# Patient Record
Sex: MALE | Race: WHITE | NOT HISPANIC OR LATINO | Employment: FULL TIME | URBAN - METROPOLITAN AREA
[De-identification: names, ages, dates, MRNs, and addresses within clinical notes are randomized per-mention and may not be internally consistent; named-entity substitution may affect disease eponyms.]

---

## 2020-09-09 PROBLEM — S62.612B: Status: ACTIVE | Noted: 2020-09-09
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
## 2020-09-22 RX ORDER — OXYCODONE HYDROCHLORIDE 5 MG/1
5 TABLET ORAL EVERY 6 HOURS PRN
Qty: 12 TABLET | Refills: 0 | Status: SHIPPED | OUTPATIENT
Start: 2020-09-22 | End: 2020-09-24 | Stop reason: SDUPTHER

## 2020-09-22 NOTE — TELEPHONE ENCOUNTER
----- Message from Em Williamson MA sent at 9/22/2020  8:14 AM CDT -----  Contact: pt  Out of antibioitic   Needs something for pain, still in pain   Pharmacy [facility]   Call back

## 2020-09-22 NOTE — TELEPHONE ENCOUNTER
Informed pt that Dr. Bautista is in surgery, and that this would be addressed as soon as he returns to clinic. Pt verbalized understanding.

## 2020-09-24 ENCOUNTER — OFFICE VISIT (OUTPATIENT)
Dept: ORTHOPEDICS | Facility: CLINIC | Age: 25
End: 2020-09-24
Payer: OTHER MISCELLANEOUS

## 2020-09-24 ENCOUNTER — HOSPITAL ENCOUNTER (OUTPATIENT)
Dept: RADIOLOGY | Facility: HOSPITAL | Age: 25
Discharge: HOME | End: 2020-09-24
Attending: ORTHOPAEDIC SURGERY
Payer: OTHER MISCELLANEOUS

## 2020-09-24 VITALS
WEIGHT: 189 LBS | SYSTOLIC BLOOD PRESSURE: 134 MMHG | DIASTOLIC BLOOD PRESSURE: 84 MMHG | HEIGHT: 71 IN | HEART RATE: 81 BPM | BODY MASS INDEX: 26.46 KG/M2
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
## 2020-09-24 DIAGNOSIS — S62.612D OPEN DISPLACED FRACTURE OF PROXIMAL PHALANX OF RIGHT MIDDLE FINGER WITH ROUTINE HEALING, SUBSEQUENT ENCOUNTER: Primary | ICD-10-CM

## 2020-09-24 DIAGNOSIS — S62.612D OPEN DISPLACED FRACTURE OF PROXIMAL PHALANX OF RIGHT MIDDLE FINGER WITH ROUTINE HEALING, SUBSEQUENT ENCOUNTER: ICD-10-CM

## 2020-09-24 PROCEDURE — 73130 X-RAY EXAM OF HAND: CPT | Mod: 26,RT,, | Performed by: RADIOLOGY

## 2020-09-24 PROCEDURE — 99999 PR PBB SHADOW E&M-EST. PATIENT-LVL III: CPT | Mod: PBBFAC,,, | Performed by: ORTHOPAEDIC SURGERY

## 2020-09-24 PROCEDURE — 73130 X-RAY EXAM OF HAND: CPT | Mod: TC,PO,RT

## 2020-09-24 PROCEDURE — 99024 PR POST-OP FOLLOW-UP VISIT: ICD-10-PCS | Mod: S$GLB,,, | Performed by: ORTHOPAEDIC SURGERY

## 2020-09-24 PROCEDURE — 99999 PR PBB SHADOW E&M-EST. PATIENT-LVL III: ICD-10-PCS | Mod: PBBFAC,,, | Performed by: ORTHOPAEDIC SURGERY

## 2020-09-24 PROCEDURE — 73130 XR HAND COMPLETE 3 VIEW RIGHT: ICD-10-PCS | Mod: 26,RT,, | Performed by: RADIOLOGY

## 2020-09-24 PROCEDURE — 99024 POSTOP FOLLOW-UP VISIT: CPT | Mod: S$GLB,,, | Performed by: ORTHOPAEDIC SURGERY

## 2020-09-24 RX ORDER — SULFAMETHOXAZOLE AND TRIMETHOPRIM 800; 160 MG/1; MG/1
1 TABLET ORAL 2 TIMES DAILY
Qty: 14 TABLET | Refills: 0 | Status: SHIPPED | OUTPATIENT
Start: 2020-09-24 | End: 2020-10-01

## 2020-09-24 NOTE — PROGRESS NOTES
Mr Lee returns to clinic today.  Has a history of right middle finger open proximal phalanx fracture with extensor tendon disruption.  He also had severe laceration to the ring finger with complex wound closure.  He is here today for his 1st postoperative visit.  He states that he has finished all of his antibiotics.    Physical exam:  Examination the right hand reveals that there is a large amount of exudate coming from the ring finger.  There is no overt wound breakdown and there is no proximal edema or erythema.  He does have wounds overlying the middle finger which are healing with sutures in place.  There is mild edema remaining to the middle finger.  He does report decreased sensation at the tip of the middle finger but intact of the ring finger.  Has capillary refill less than 2 sec at the tips of both of the fingers.    Radiology:  X-rays of the right hand were taken in clinic today.  He is noted have a comminuted fracture of the proximal phalanx of the middle finger.  This is stabilized with pin and screw fixation.  Fractures migraine relatively well aligned.    Assessment:  Right middle finger open proximal phalanx fracture with extensor tendon disruption, right ring finger complex laceration post closure    Plan:    1.  All of his wounds were cleaned today and a splint was placed to the middle finger    2.  I will resume antibiotics but will place him on Bactrim DS for 7 additional days    3.  He will follow up with me in 1 week for repeat evaluation.    4.  He will begin daily washing and dressing changes to the ring finger

## 2020-09-29 RX ORDER — OXYCODONE HYDROCHLORIDE 5 MG/1
5 TABLET ORAL EVERY 8 HOURS PRN
Qty: 8 TABLET | Refills: 0 | Status: ON HOLD | OUTPATIENT
Start: 2020-09-29 | End: 2021-01-14 | Stop reason: HOSPADM

## 2020-09-30 ENCOUNTER — OFFICE VISIT (OUTPATIENT)
Dept: ORTHOPEDICS | Facility: CLINIC | Age: 25
End: 2020-09-30
Payer: OTHER MISCELLANEOUS

## 2020-09-30 VITALS — WEIGHT: 188.94 LBS | HEIGHT: 71 IN | BODY MASS INDEX: 26.45 KG/M2
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
## 2020-09-30 DIAGNOSIS — S62.612D OPEN DISPLACED FRACTURE OF PROXIMAL PHALANX OF RIGHT MIDDLE FINGER WITH ROUTINE HEALING, SUBSEQUENT ENCOUNTER: ICD-10-CM

## 2020-09-30 PROCEDURE — 99999 PR PBB SHADOW E&M-EST. PATIENT-LVL III: ICD-10-PCS | Mod: PBBFAC,,, | Performed by: ORTHOPAEDIC SURGERY

## 2020-09-30 PROCEDURE — 99024 PR POST-OP FOLLOW-UP VISIT: ICD-10-PCS | Mod: S$GLB,,, | Performed by: ORTHOPAEDIC SURGERY

## 2020-09-30 PROCEDURE — 99024 POSTOP FOLLOW-UP VISIT: CPT | Mod: S$GLB,,, | Performed by: ORTHOPAEDIC SURGERY

## 2020-09-30 PROCEDURE — 99999 PR PBB SHADOW E&M-EST. PATIENT-LVL III: CPT | Mod: PBBFAC,,, | Performed by: ORTHOPAEDIC SURGERY

## 2020-10-06 NOTE — PROGRESS NOTES
Mr Lee returns to clinic today.  Has a history of right middle finger open proximal phalanx fracture with extensor tendon disruption is also complex laceration of the ring finger.  He is overall doing well.  He continues to improve.    Physical exam:  Examination the right hand reveals that the wounds continue to improve.  There is several remaining sutures noted.  There is mild edema remaining to both of the fingers.  There is capillary refill less than 2 sec overall finger tips.        Assessment:  Right middle finger proximal phalanx fracture with extensor disruption, right ring finger complex wound closure    Plan:    1.  There were a few sutures remaining which were removed today.    2.  A splint was placed onto the middle finger.  Soft dressing was placed onto the ring finger.  Patient tolerated these well    3.  The patient will follow up with me in 10-14 days for repeat evaluation with an x-ray of the right hand at which point I will consider beginning daily dressing changes and washing

## 2020-10-20 DIAGNOSIS — S62.612D OPEN DISPLACED FRACTURE OF PROXIMAL PHALANX OF RIGHT MIDDLE FINGER WITH ROUTINE HEALING, SUBSEQUENT ENCOUNTER: Primary | ICD-10-CM

## 2020-10-21 ENCOUNTER — OFFICE VISIT (OUTPATIENT)
Dept: ORTHOPEDICS | Facility: CLINIC | Age: 25
End: 2020-10-21
Payer: OTHER MISCELLANEOUS

## 2020-10-21 ENCOUNTER — HOSPITAL ENCOUNTER (OUTPATIENT)
Dept: RADIOLOGY | Facility: HOSPITAL | Age: 25
Discharge: HOME | End: 2020-10-21
Attending: ORTHOPAEDIC SURGERY
Payer: OTHER MISCELLANEOUS

## 2020-10-21 VITALS
SYSTOLIC BLOOD PRESSURE: 154 MMHG | DIASTOLIC BLOOD PRESSURE: 87 MMHG | HEIGHT: 71 IN | HEART RATE: 89 BPM | WEIGHT: 188.94 LBS | BODY MASS INDEX: 26.45 KG/M2

## 2020-10-21 DIAGNOSIS — S56.429D EXTENSOR TENDON LACERATION, FINGER, OPEN WOUND, SUBSEQUENT ENCOUNTER: ICD-10-CM

## 2020-10-21 DIAGNOSIS — S62.612D OPEN DISPLACED FRACTURE OF PROXIMAL PHALANX OF RIGHT MIDDLE FINGER WITH ROUTINE HEALING, SUBSEQUENT ENCOUNTER: ICD-10-CM

## 2020-10-21 DIAGNOSIS — S62.612D OPEN DISPLACED FRACTURE OF PROXIMAL PHALANX OF RIGHT MIDDLE FINGER WITH ROUTINE HEALING, SUBSEQUENT ENCOUNTER: Primary | ICD-10-CM
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
## 2020-10-21 DIAGNOSIS — S61.209D EXTENSOR TENDON LACERATION, FINGER, OPEN WOUND, SUBSEQUENT ENCOUNTER: ICD-10-CM

## 2020-10-21 PROCEDURE — 99999 PR PBB SHADOW E&M-EST. PATIENT-LVL III: ICD-10-PCS | Mod: PBBFAC,,, | Performed by: ORTHOPAEDIC SURGERY

## 2020-10-21 PROCEDURE — 99024 PR POST-OP FOLLOW-UP VISIT: ICD-10-PCS | Mod: S$GLB,,, | Performed by: ORTHOPAEDIC SURGERY

## 2020-10-21 PROCEDURE — 73130 XR HAND COMPLETE 3 VIEW RIGHT: ICD-10-PCS | Mod: 26,RT,, | Performed by: RADIOLOGY

## 2020-10-21 PROCEDURE — 73130 X-RAY EXAM OF HAND: CPT | Mod: TC,PO,RT

## 2020-10-21 PROCEDURE — 73130 X-RAY EXAM OF HAND: CPT | Mod: 26,RT,, | Performed by: RADIOLOGY

## 2020-10-21 PROCEDURE — 99999 PR PBB SHADOW E&M-EST. PATIENT-LVL III: CPT | Mod: PBBFAC,,, | Performed by: ORTHOPAEDIC SURGERY

## 2020-10-21 PROCEDURE — 99024 POSTOP FOLLOW-UP VISIT: CPT | Mod: S$GLB,,, | Performed by: ORTHOPAEDIC SURGERY

## 2020-10-21 NOTE — PROGRESS NOTES
Mr Lee returns to clinic today.  Has a history of right hand saw injury.  Had massive wound to the middle finger.  This included wrist disruption of the extensor tendon as well as comminuted fracture of the middle phalanx.  The ring finger also had a massive soft tissue injury noted.  He is overall improving.  He has discontinued his splint and has begun working on his own on his motion.    Physical exam:  Examination the right hand reveals that all wounds are now healed.  There is an obvious flexion deformity of the PIP joint of the middle finger.  Palpation about the hand finger does produce moderate tenderness over the middle and ring fingers.  He is able to flex and extend the ring finger without significant limitation.  The middle finger has approximately 20° of flexion at the PIP joint.  He is able to extend with a limitation of 45° from full extension.  This is both active and passive.  He does report mild decreased sensation over the tip of the finger and capillary refill less than 2 sec    Radiology:  X-rays of the right middle finger and hand were taken in clinic today.  There is noted to be comminuted fracture of the middle finger proximal phalanx.  This stabilized with plate screw and K-wire fixation.    Assessment:  Right hand table saw injury with disruption of the extensor mechanism and fracture of the proximal phalanx of the middle finger as well as massive soft tissue injury to the ring finger    Plan:    1.  I will set him up with occupational therapy to begin gentle range of motion, edema control, and scar massage    2.  I will allow him to return to work without the use of the right arm or hand    3.  He will follow up with me in 3 weeks with repeat x-rays of the right middle finger which point I will discuss any further treatment needs

## 2020-10-21 NOTE — LETTER
October 21, 2020      [facility]  [address] [place] [address]  Phone: [phone]       Patient: Marcus Lee   YOB: 1995  Date of Visit: 10/21/2020    To Whom It May Concern:    Lizy Lee  was at [facility] on 10/21/2020. He may return to work/school on 10/21/2020 with restrictions of no use of right hand, and allow for intermittent absences to attend therapy. If you have any questions or concerns, or if I can be of further assistance, please do not hesitate to contact me.    Sincerely,    Sadie Pimentel LPN

## 2020-10-30 ENCOUNTER — TELEPHONE (OUTPATIENT)
Dept: ORTHOPEDICS | Facility: CLINIC | Age: 25
End: 2020-10-30

## 2020-10-30 NOTE — TELEPHONE ENCOUNTER
Spoke with patient. Explained that Dr. Bautista and his staff would not be back into the office until next week, but I would forward the message to them. Patient states that he just wants to make sure his worker's comp paperwork was sent to his employer. Understanding verbalized.

## 2020-11-11 DIAGNOSIS — S62.612D OPEN DISPLACED FRACTURE OF PROXIMAL PHALANX OF RIGHT MIDDLE FINGER WITH ROUTINE HEALING, SUBSEQUENT ENCOUNTER: Primary | ICD-10-CM

## 2020-12-09 ENCOUNTER — OFFICE VISIT (OUTPATIENT)
Dept: ORTHOPEDICS | Facility: CLINIC | Age: 25
End: 2020-12-09
Payer: OTHER MISCELLANEOUS

## 2020-12-09 ENCOUNTER — HOSPITAL ENCOUNTER (OUTPATIENT)
Dept: RADIOLOGY | Facility: HOSPITAL | Age: 25
Discharge: HOME | End: 2020-12-09
Attending: ORTHOPAEDIC SURGERY
Payer: OTHER MISCELLANEOUS

## 2020-12-09 VITALS
BODY MASS INDEX: 26.45 KG/M2 | HEART RATE: 125 BPM | SYSTOLIC BLOOD PRESSURE: 158 MMHG | WEIGHT: 188.94 LBS | DIASTOLIC BLOOD PRESSURE: 97 MMHG | HEIGHT: 71 IN

## 2020-12-09 DIAGNOSIS — S62.612D OPEN DISPLACED FRACTURE OF PROXIMAL PHALANX OF RIGHT MIDDLE FINGER WITH ROUTINE HEALING, SUBSEQUENT ENCOUNTER: ICD-10-CM
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
## 2020-12-09 DIAGNOSIS — S61.209S EXTENSOR TENDON LACERATION OF FINGER WITH OPEN WOUND, SEQUELA: ICD-10-CM

## 2020-12-09 DIAGNOSIS — S62.612D OPEN DISPLACED FRACTURE OF PROXIMAL PHALANX OF RIGHT MIDDLE FINGER WITH ROUTINE HEALING, SUBSEQUENT ENCOUNTER: Primary | ICD-10-CM

## 2020-12-09 DIAGNOSIS — S56.429S EXTENSOR TENDON LACERATION OF FINGER WITH OPEN WOUND, SEQUELA: ICD-10-CM

## 2020-12-09 PROBLEM — S61.209A EXTENSOR TENDON LACERATION OF FINGER WITH OPEN WOUND: Status: ACTIVE | Noted: 2020-12-09

## 2020-12-09 PROBLEM — S56.429A EXTENSOR TENDON LACERATION OF FINGER WITH OPEN WOUND: Status: ACTIVE | Noted: 2020-12-09

## 2020-12-09 PROCEDURE — 73140 X-RAY EXAM OF FINGER(S): CPT | Mod: TC,PO

## 2020-12-09 PROCEDURE — 73140 X-RAY EXAM OF FINGER(S): CPT | Mod: 26,RT,, | Performed by: RADIOLOGY

## 2020-12-09 PROCEDURE — 99024 PR POST-OP FOLLOW-UP VISIT: ICD-10-PCS | Mod: S$GLB,,, | Performed by: ORTHOPAEDIC SURGERY

## 2020-12-09 PROCEDURE — 99999 PR PBB SHADOW E&M-EST. PATIENT-LVL III: ICD-10-PCS | Mod: PBBFAC,,, | Performed by: ORTHOPAEDIC SURGERY

## 2020-12-09 PROCEDURE — 73140 XR FINGER 2 OR MORE VIEWS: ICD-10-PCS | Mod: 26,RT,, | Performed by: RADIOLOGY

## 2020-12-09 PROCEDURE — 99999 PR PBB SHADOW E&M-EST. PATIENT-LVL III: CPT | Mod: PBBFAC,,, | Performed by: ORTHOPAEDIC SURGERY

## 2020-12-09 PROCEDURE — 99024 POSTOP FOLLOW-UP VISIT: CPT | Mod: S$GLB,,, | Performed by: ORTHOPAEDIC SURGERY

## 2020-12-09 NOTE — LETTER
December 9, 2020      [facility]  [address] [place] [address]  Phone: [phone]       Patient: Marcus Lee   YOB: 1995  Date of Visit: 12/09/2020    To Whom It May Concern:    Lizy Lee  was at [facility] on 12/09/2020. He may return to work/school on 12/10/2020 with restrictions of 2lb weight limit to right hand. If you have any questions or concerns, or if I can be of further assistance, please do not hesitate to contact me.    Sincerely,    Dr. Leo Bautista MD

## 2020-12-09 NOTE — PROGRESS NOTES
Mr Lee returns to clinic today.  Has a history of a saw injury to his right hand.  The worst of the injuries was to his middle finger.  He had an open fracture of the middle phalanx with a severely disrupted extensor mechanism.  He missed a couple of therapy appointments and was discharged from therapy and so he has been working on at home.  He has improved but he is still having some stiffness    Physical exam:  Examination the right hand middle finger reveals that all wounds are well healed.  There is minimal edema.  There is no erythema.  Palpation about the middle finger and the remainder of the hand is not significantly tender.  He is able to flex to within 1 cm of the distal palmar crease and extend to within 20° of full extension at all the joints including the PIP joint.  He does report grossly intact sensation at the tip of the finger and capillary refill is less than 2 sec    Radiology:  X-rays of the right hand middle finger taken in clinic today.  He is noted to have comminuted fracture of the middle phalanx which is stabilized with K-wire and screw fixation.  The fixation remains stable and the fractures appear healed    Assessment:  Right hand saw injury with middle phalanx open phalanx fracture and extensor mechanism disruption    Plan:    1.  I will reinstitute therapy to assist in increasing his motion    2.  He is allowed to begin to increase activities as tolerated    3.  Will allow him to return to work with a 2-3 lb weight limit to the right hand    4.  Will follow up with me in 6 weeks with repeat x-ray of the right hand for repeat evaluation

## 2020-12-14 ENCOUNTER — TELEPHONE (OUTPATIENT)
Dept: ORTHOPEDICS | Facility: CLINIC | Age: 25
End: 2020-12-14

## 2020-12-14 NOTE — TELEPHONE ENCOUNTER
Referral sent  ----- Message from Em Williamson MA sent at 12/14/2020  8:21 AM CST -----  Contact: pt  Therapy referral needs to be sent to  for auth   Call back    Per PT.

## 2021-01-04 ENCOUNTER — TELEPHONE (OUTPATIENT)
Dept: ORTHOPEDICS | Facility: CLINIC | Age: 26
End: 2021-01-04

## 2021-01-07 ENCOUNTER — OFFICE VISIT (OUTPATIENT)
Dept: ORTHOPEDICS | Facility: CLINIC | Age: 26
End: 2021-01-07
Payer: OTHER MISCELLANEOUS

## 2021-01-07 ENCOUNTER — HOSPITAL ENCOUNTER (OUTPATIENT)
Dept: RADIOLOGY | Facility: HOSPITAL | Age: 26
Discharge: HOME | End: 2021-01-07
Attending: ORTHOPAEDIC SURGERY
Payer: OTHER MISCELLANEOUS

## 2021-01-07 VITALS
HEIGHT: 71 IN | HEART RATE: 53 BPM | WEIGHT: 188 LBS | BODY MASS INDEX: 26.32 KG/M2 | SYSTOLIC BLOOD PRESSURE: 124 MMHG | DIASTOLIC BLOOD PRESSURE: 79 MMHG

## 2021-01-07 DIAGNOSIS — Z96.9 RETAINED ORTHOPEDIC HARDWARE: Primary | ICD-10-CM

## 2021-01-07 DIAGNOSIS — S62.612D OPEN DISPLACED FRACTURE OF PROXIMAL PHALANX OF RIGHT MIDDLE FINGER WITH ROUTINE HEALING, SUBSEQUENT ENCOUNTER: ICD-10-CM

## 2021-01-07 DIAGNOSIS — S62.612D OPEN DISPLACED FRACTURE OF PROXIMAL PHALANX OF RIGHT MIDDLE FINGER WITH ROUTINE HEALING, SUBSEQUENT ENCOUNTER: Primary | ICD-10-CM

## 2021-01-07 PROCEDURE — 99999 PR PBB SHADOW E&M-EST. PATIENT-LVL IV: CPT | Mod: PBBFAC,,, | Performed by: ORTHOPAEDIC SURGERY

## 2021-01-07 PROCEDURE — 99999 PR PBB SHADOW E&M-EST. PATIENT-LVL IV: ICD-10-PCS | Mod: PBBFAC,,, | Performed by: ORTHOPAEDIC SURGERY

## 2021-01-07 PROCEDURE — 99213 PR OFFICE/OUTPT VISIT, EST, LEVL III, 20-29 MIN: ICD-10-PCS | Mod: S$GLB,,, | Performed by: ORTHOPAEDIC SURGERY

## 2021-01-07 PROCEDURE — 73130 XR HAND COMPLETE 3 VIEW RIGHT: ICD-10-PCS | Mod: 26,RT,, | Performed by: RADIOLOGY

## 2021-01-07 PROCEDURE — 73130 X-RAY EXAM OF HAND: CPT | Mod: 26,RT,, | Performed by: RADIOLOGY

## 2021-01-07 PROCEDURE — 73130 X-RAY EXAM OF HAND: CPT | Mod: TC,PO,RT

## 2021-01-07 PROCEDURE — 99213 OFFICE O/P EST LOW 20 MIN: CPT | Mod: S$GLB,,, | Performed by: ORTHOPAEDIC SURGERY

## 2021-01-07 RX ORDER — LIDOCAINE HYDROCHLORIDE 10 MG/ML
1 INJECTION, SOLUTION EPIDURAL; INFILTRATION; INTRACAUDAL; PERINEURAL ONCE
Status: CANCELLED | OUTPATIENT
Start: 2021-01-07 | End: 2021-01-07

## 2021-01-13 ENCOUNTER — TELEPHONE (OUTPATIENT)
Dept: SURGERY | Facility: HOSPITAL | Age: 26
End: 2021-01-13

## 2021-01-14 ENCOUNTER — HOSPITAL ENCOUNTER (OUTPATIENT)
Dept: RADIOLOGY | Facility: HOSPITAL | Age: 26
Discharge: HOME | End: 2021-01-14
Attending: ORTHOPAEDIC SURGERY

## 2021-01-14 ENCOUNTER — HOSPITAL ENCOUNTER (OUTPATIENT)
Facility: HOSPITAL | Age: 26
Discharge: HOME | End: 2021-01-14
Attending: ORTHOPAEDIC SURGERY | Admitting: ORTHOPAEDIC SURGERY
Payer: OTHER MISCELLANEOUS

## 2021-01-14 DIAGNOSIS — Z96.9 RETAINED ORTHOPEDIC HARDWARE: ICD-10-CM

## 2021-01-14 DIAGNOSIS — L02.511 ABSCESS OF FINGER OF RIGHT HAND: Primary | ICD-10-CM

## 2021-01-14 LAB
ACID FAST MOD KINY STN SPEC: NORMAL
SARS-COV-2 RDRP RESP QL NAA+PROBE: NEGATIVE

## 2021-01-14 PROCEDURE — 87075 CULTR BACTERIA EXCEPT BLOOD: CPT

## 2021-01-14 PROCEDURE — 87206 SMEAR FLUORESCENT/ACID STAI: CPT

## 2021-01-14 PROCEDURE — 25000003 PHARM REV CODE 250: Mod: PO | Performed by: ORTHOPAEDIC SURGERY

## 2021-01-14 PROCEDURE — 87070 CULTURE OTHR SPECIMN AEROBIC: CPT

## 2021-01-14 PROCEDURE — 76000 FLUOROSCOPY <1 HR PHYS/QHP: CPT | Mod: TC,PO

## 2021-01-14 PROCEDURE — 36000706: Mod: PO | Performed by: ORTHOPAEDIC SURGERY

## 2021-01-14 PROCEDURE — 36000707: Mod: PO | Performed by: ORTHOPAEDIC SURGERY

## 2021-01-14 PROCEDURE — 71000015 HC POSTOP RECOV 1ST HR: Mod: PO | Performed by: ORTHOPAEDIC SURGERY

## 2021-01-14 PROCEDURE — 20611 DRAIN/INJ JOINT/BURSA W/US: CPT | Mod: RT,,, | Performed by: ORTHOPAEDIC SURGERY

## 2021-01-14 PROCEDURE — 63600175 PHARM REV CODE 636 W HCPCS: Mod: PO | Performed by: ORTHOPAEDIC SURGERY

## 2021-01-14 PROCEDURE — 87102 FUNGUS ISOLATION CULTURE: CPT

## 2021-01-14 PROCEDURE — 20611 PR DRAIN/ASP/INJECT MAJOR JOINT/BURSA W/US GUIDANCE: ICD-10-PCS | Mod: RT,,, | Performed by: ORTHOPAEDIC SURGERY

## 2021-01-14 PROCEDURE — U0002 COVID-19 LAB TEST NON-CDC: HCPCS | Mod: PO

## 2021-01-14 RX ORDER — BUPIVACAINE HYDROCHLORIDE 2.5 MG/ML
INJECTION, SOLUTION EPIDURAL; INFILTRATION; INTRACAUDAL
Status: DISCONTINUED | OUTPATIENT
Start: 2021-01-14 | End: 2021-01-14 | Stop reason: HOSPADM

## 2021-01-14 RX ORDER — SODIUM CHLORIDE, SODIUM LACTATE, POTASSIUM CHLORIDE, CALCIUM CHLORIDE 600; 310; 30; 20 MG/100ML; MG/100ML; MG/100ML; MG/100ML
INJECTION, SOLUTION INTRAVENOUS CONTINUOUS
Status: DISCONTINUED | OUTPATIENT
Start: 2021-01-14 | End: 2021-01-14 | Stop reason: HOSPADM

## 2021-01-14 RX ORDER — HYDROCODONE BITARTRATE AND ACETAMINOPHEN 5; 325 MG/1; MG/1
1 TABLET ORAL EVERY 6 HOURS PRN
Qty: 6 TABLET | Refills: 0 | Status: SHIPPED | OUTPATIENT
Start: 2021-01-14 | End: 2021-10-26

## 2021-01-14 RX ORDER — CEFAZOLIN SODIUM 2 G/50ML
2 SOLUTION INTRAVENOUS
Status: COMPLETED | OUTPATIENT
Start: 2021-01-14 | End: 2021-01-14

## 2021-01-14 RX ORDER — LIDOCAINE HYDROCHLORIDE 10 MG/ML
INJECTION, SOLUTION EPIDURAL; INFILTRATION; INTRACAUDAL; PERINEURAL
Status: DISCONTINUED | OUTPATIENT
Start: 2021-01-14 | End: 2021-01-14 | Stop reason: HOSPADM

## 2021-01-14 RX ORDER — SULFAMETHOXAZOLE AND TRIMETHOPRIM 800; 160 MG/1; MG/1
1 TABLET ORAL 2 TIMES DAILY
Qty: 14 TABLET | Refills: 0 | Status: SHIPPED | OUTPATIENT
Start: 2021-01-14 | End: 2021-01-21

## 2021-01-14 RX ORDER — LIDOCAINE HYDROCHLORIDE 10 MG/ML
1 INJECTION, SOLUTION EPIDURAL; INFILTRATION; INTRACAUDAL; PERINEURAL ONCE
Status: DISCONTINUED | OUTPATIENT
Start: 2021-01-14 | End: 2021-01-14 | Stop reason: HOSPADM

## 2021-01-14 RX ADMIN — SODIUM CHLORIDE, SODIUM LACTATE, POTASSIUM CHLORIDE, AND CALCIUM CHLORIDE: .6; .31; .03; .02 INJECTION, SOLUTION INTRAVENOUS at 08:01

## 2021-01-15 ENCOUNTER — TELEPHONE (OUTPATIENT)
Dept: SURGERY | Facility: HOSPITAL | Age: 26
End: 2021-01-15

## 2021-01-15 VITALS
RESPIRATION RATE: 17 BRPM | HEIGHT: 71 IN | BODY MASS INDEX: 26.32 KG/M2 | HEART RATE: 58 BPM | OXYGEN SATURATION: 97 % | TEMPERATURE: 98 F | SYSTOLIC BLOOD PRESSURE: 136 MMHG | DIASTOLIC BLOOD PRESSURE: 69 MMHG | WEIGHT: 188 LBS

## 2021-01-18 LAB — BACTERIA SPEC AEROBE CULT: NO GROWTH

## 2021-01-19 LAB — BACTERIA SPEC ANAEROBE CULT: NORMAL

## 2021-01-27 ENCOUNTER — OFFICE VISIT (OUTPATIENT)
Dept: ORTHOPEDICS | Facility: CLINIC | Age: 26
End: 2021-01-27
Payer: OTHER MISCELLANEOUS

## 2021-01-27 VITALS
BODY MASS INDEX: 26.32 KG/M2 | HEIGHT: 71 IN | WEIGHT: 188 LBS | SYSTOLIC BLOOD PRESSURE: 133 MMHG | DIASTOLIC BLOOD PRESSURE: 89 MMHG | HEART RATE: 100 BPM

## 2021-01-27 DIAGNOSIS — Z96.9 RETAINED ORTHOPEDIC HARDWARE: Primary | ICD-10-CM
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
## 2021-01-27 PROCEDURE — 99024 POSTOP FOLLOW-UP VISIT: CPT | Mod: S$GLB,,, | Performed by: ORTHOPAEDIC SURGERY

## 2021-01-27 PROCEDURE — 99999 PR PBB SHADOW E&M-EST. PATIENT-LVL III: ICD-10-PCS | Mod: PBBFAC,,, | Performed by: ORTHOPAEDIC SURGERY

## 2021-01-27 PROCEDURE — 99024 PR POST-OP FOLLOW-UP VISIT: ICD-10-PCS | Mod: S$GLB,,, | Performed by: ORTHOPAEDIC SURGERY

## 2021-01-27 PROCEDURE — 99999 PR PBB SHADOW E&M-EST. PATIENT-LVL III: CPT | Mod: PBBFAC,,, | Performed by: ORTHOPAEDIC SURGERY

## 2021-02-03 ENCOUNTER — TELEPHONE (OUTPATIENT)
Dept: ORTHOPEDICS | Facility: CLINIC | Age: 26
End: 2021-02-03

## 2021-02-09 DIAGNOSIS — S62.612D OPEN DISPLACED FRACTURE OF PROXIMAL PHALANX OF RIGHT MIDDLE FINGER WITH ROUTINE HEALING, SUBSEQUENT ENCOUNTER: Primary | ICD-10-CM

## 2021-02-10 ENCOUNTER — OFFICE VISIT (OUTPATIENT)
Dept: ORTHOPEDICS | Facility: CLINIC | Age: 26
End: 2021-02-10
Payer: OTHER MISCELLANEOUS

## 2021-02-10 ENCOUNTER — HOSPITAL ENCOUNTER (OUTPATIENT)
Dept: RADIOLOGY | Facility: HOSPITAL | Age: 26
Discharge: HOME | End: 2021-02-10
Attending: ORTHOPAEDIC SURGERY
Payer: OTHER MISCELLANEOUS

## 2021-02-10 VITALS
SYSTOLIC BLOOD PRESSURE: 130 MMHG | WEIGHT: 188 LBS | HEIGHT: 71 IN | BODY MASS INDEX: 26.32 KG/M2 | HEART RATE: 78 BPM | DIASTOLIC BLOOD PRESSURE: 84 MMHG

## 2021-02-10 DIAGNOSIS — S56.429S EXTENSOR TENDON LACERATION OF FINGER WITH OPEN WOUND, SEQUELA: ICD-10-CM

## 2021-02-10 DIAGNOSIS — S62.612D OPEN DISPLACED FRACTURE OF PROXIMAL PHALANX OF RIGHT MIDDLE FINGER WITH ROUTINE HEALING, SUBSEQUENT ENCOUNTER: ICD-10-CM

## 2021-02-10 DIAGNOSIS — S61.209S EXTENSOR TENDON LACERATION OF FINGER WITH OPEN WOUND, SEQUELA: ICD-10-CM
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
## 2021-02-10 PROCEDURE — 99024 PR POST-OP FOLLOW-UP VISIT: ICD-10-PCS | Mod: S$GLB,,, | Performed by: ORTHOPAEDIC SURGERY

## 2021-02-10 PROCEDURE — 73140 X-RAY EXAM OF FINGER(S): CPT | Mod: 26,RT,, | Performed by: RADIOLOGY

## 2021-02-10 PROCEDURE — 99999 PR PBB SHADOW E&M-EST. PATIENT-LVL III: ICD-10-PCS | Mod: PBBFAC,,, | Performed by: ORTHOPAEDIC SURGERY

## 2021-02-10 PROCEDURE — 99999 PR PBB SHADOW E&M-EST. PATIENT-LVL III: CPT | Mod: PBBFAC,,, | Performed by: ORTHOPAEDIC SURGERY

## 2021-02-10 PROCEDURE — 73140 X-RAY EXAM OF FINGER(S): CPT | Mod: TC,PO,RT

## 2021-02-10 PROCEDURE — 99024 POSTOP FOLLOW-UP VISIT: CPT | Mod: S$GLB,,, | Performed by: ORTHOPAEDIC SURGERY

## 2021-02-10 PROCEDURE — 73140 XR FINGER 2 OR MORE VIEWS RIGHT: ICD-10-PCS | Mod: 26,RT,, | Performed by: RADIOLOGY

## 2021-02-17 LAB — FUNGUS SPEC CULT: NORMAL

## 2021-02-26 ENCOUNTER — TELEPHONE (OUTPATIENT)
Dept: ORTHOPEDICS | Facility: CLINIC | Age: 26
End: 2021-02-26
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
## 2021-03-24 ENCOUNTER — TELEPHONE (OUTPATIENT)
Dept: ORTHOPEDICS | Facility: CLINIC | Age: 26
End: 2021-03-24

## 2021-03-25 ENCOUNTER — TELEPHONE (OUTPATIENT)
Dept: ORTHOPEDICS | Facility: CLINIC | Age: 26
End: 2021-03-25

## 2021-03-29 ENCOUNTER — TELEPHONE (OUTPATIENT)
Dept: ORTHOPEDICS | Facility: CLINIC | Age: 26
End: 2021-03-29
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
## 2021-04-12 ENCOUNTER — HOSPITAL ENCOUNTER (OUTPATIENT)
Dept: RADIOLOGY | Facility: HOSPITAL | Age: 26
Discharge: HOME | End: 2021-04-12
Attending: ORTHOPAEDIC SURGERY
Payer: OTHER MISCELLANEOUS

## 2021-04-12 ENCOUNTER — OFFICE VISIT (OUTPATIENT)
Dept: ORTHOPEDICS | Facility: CLINIC | Age: 26
End: 2021-04-12
Payer: OTHER MISCELLANEOUS

## 2021-04-12 VITALS
DIASTOLIC BLOOD PRESSURE: 91 MMHG | BODY MASS INDEX: 26.32 KG/M2 | HEIGHT: 71 IN | WEIGHT: 188 LBS | HEART RATE: 107 BPM | SYSTOLIC BLOOD PRESSURE: 140 MMHG
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
## 2021-04-12 DIAGNOSIS — S62.612D OPEN DISPLACED FRACTURE OF PROXIMAL PHALANX OF RIGHT MIDDLE FINGER WITH ROUTINE HEALING, SUBSEQUENT ENCOUNTER: Primary | ICD-10-CM

## 2021-04-12 PROCEDURE — 73130 X-RAY EXAM OF HAND: CPT | Mod: TC,PO,RT

## 2021-04-12 PROCEDURE — 99024 PR POST-OP FOLLOW-UP VISIT: ICD-10-PCS | Mod: S$GLB,,, | Performed by: ORTHOPAEDIC SURGERY

## 2021-04-12 PROCEDURE — 73130 XR HAND COMPLETE 3 VIEW RIGHT: ICD-10-PCS | Mod: 26,RT,, | Performed by: RADIOLOGY

## 2021-04-12 PROCEDURE — 99024 POSTOP FOLLOW-UP VISIT: CPT | Mod: S$GLB,,, | Performed by: ORTHOPAEDIC SURGERY

## 2021-04-12 PROCEDURE — 99999 PR PBB SHADOW E&M-EST. PATIENT-LVL III: CPT | Mod: PBBFAC,,, | Performed by: ORTHOPAEDIC SURGERY

## 2021-04-12 PROCEDURE — 73130 X-RAY EXAM OF HAND: CPT | Mod: 26,RT,, | Performed by: RADIOLOGY

## 2021-04-12 PROCEDURE — 99999 PR PBB SHADOW E&M-EST. PATIENT-LVL III: ICD-10-PCS | Mod: PBBFAC,,, | Performed by: ORTHOPAEDIC SURGERY

## 2021-04-16 ENCOUNTER — TELEPHONE (OUTPATIENT)
Dept: ORTHOPEDICS | Facility: CLINIC | Age: 26
End: 2021-04-16

## 2021-05-06 ENCOUNTER — TELEPHONE (OUTPATIENT)
Dept: ORTHOPEDICS | Facility: CLINIC | Age: 26
End: 2021-05-06

## 2021-05-19 ENCOUNTER — HOSPITAL ENCOUNTER (OUTPATIENT)
Dept: RADIOLOGY | Facility: HOSPITAL | Age: 26
Discharge: HOME | End: 2021-05-19
Attending: ORTHOPAEDIC SURGERY

## 2021-05-19 ENCOUNTER — OFFICE VISIT (OUTPATIENT)
Dept: ORTHOPEDICS | Facility: CLINIC | Age: 26
End: 2021-05-19
Payer: OTHER MISCELLANEOUS

## 2021-05-19 VITALS
HEART RATE: 77 BPM | WEIGHT: 188 LBS | SYSTOLIC BLOOD PRESSURE: 135 MMHG | HEIGHT: 71 IN | BODY MASS INDEX: 26.32 KG/M2 | DIASTOLIC BLOOD PRESSURE: 89 MMHG

## 2021-05-19 DIAGNOSIS — S61.209S EXTENSOR TENDON LACERATION OF FINGER WITH OPEN WOUND, SEQUELA: ICD-10-CM

## 2021-05-19 DIAGNOSIS — S56.429S EXTENSOR TENDON LACERATION OF FINGER WITH OPEN WOUND, SEQUELA: ICD-10-CM

## 2021-05-19 DIAGNOSIS — S62.612D OPEN DISPLACED FRACTURE OF PROXIMAL PHALANX OF RIGHT MIDDLE FINGER WITH ROUTINE HEALING, SUBSEQUENT ENCOUNTER: ICD-10-CM

## 2021-05-19 DIAGNOSIS — S62.612D OPEN DISPLACED FRACTURE OF PROXIMAL PHALANX OF RIGHT MIDDLE FINGER WITH ROUTINE HEALING, SUBSEQUENT ENCOUNTER: Primary | ICD-10-CM

## 2021-05-19 PROCEDURE — 99213 OFFICE O/P EST LOW 20 MIN: CPT | Mod: S$GLB,,, | Performed by: ORTHOPAEDIC SURGERY

## 2021-05-19 PROCEDURE — 73140 XR FINGER 2 OR MORE VIEWS: ICD-10-PCS | Mod: 26,RT,, | Performed by: RADIOLOGY

## 2021-05-19 PROCEDURE — 99999 PR PBB SHADOW E&M-EST. PATIENT-LVL III: CPT | Mod: PBBFAC,,, | Performed by: ORTHOPAEDIC SURGERY

## 2021-05-19 PROCEDURE — 99999 PR PBB SHADOW E&M-EST. PATIENT-LVL III: ICD-10-PCS | Mod: PBBFAC,,, | Performed by: ORTHOPAEDIC SURGERY

## 2021-05-19 PROCEDURE — 99213 PR OFFICE/OUTPT VISIT, EST, LEVL III, 20-29 MIN: ICD-10-PCS | Mod: S$GLB,,, | Performed by: ORTHOPAEDIC SURGERY

## 2021-05-19 PROCEDURE — 73140 X-RAY EXAM OF FINGER(S): CPT | Mod: 26,RT,, | Performed by: RADIOLOGY

## 2021-05-19 PROCEDURE — 73140 X-RAY EXAM OF FINGER(S): CPT | Mod: TC,PO

## 2021-05-25 ENCOUNTER — TELEPHONE (OUTPATIENT)
Dept: ORTHOPEDICS | Facility: CLINIC | Age: 26
End: 2021-05-25

## 2021-07-29 DIAGNOSIS — M79.641 RIGHT HAND PAIN: ICD-10-CM

## 2021-07-29 DIAGNOSIS — S62.612D OPEN DISPLACED FRACTURE OF PROXIMAL PHALANX OF RIGHT MIDDLE FINGER WITH ROUTINE HEALING, SUBSEQUENT ENCOUNTER: Primary | ICD-10-CM

## 2021-08-02 ENCOUNTER — HOSPITAL ENCOUNTER (OUTPATIENT)
Dept: RADIOLOGY | Facility: HOSPITAL | Age: 26
Discharge: HOME | End: 2021-08-02
Attending: PHYSICIAN ASSISTANT
Payer: OTHER MISCELLANEOUS

## 2021-08-02 ENCOUNTER — OFFICE VISIT (OUTPATIENT)
Dept: ORTHOPEDICS | Facility: CLINIC | Age: 26
End: 2021-08-02
Payer: OTHER MISCELLANEOUS

## 2021-08-02 VITALS
WEIGHT: 188 LBS | BODY MASS INDEX: 26.32 KG/M2 | HEART RATE: 60 BPM | SYSTOLIC BLOOD PRESSURE: 128 MMHG | DIASTOLIC BLOOD PRESSURE: 88 MMHG | HEIGHT: 71 IN

## 2021-08-02 DIAGNOSIS — S62.612D OPEN DISPLACED FRACTURE OF PROXIMAL PHALANX OF RIGHT MIDDLE FINGER WITH ROUTINE HEALING, SUBSEQUENT ENCOUNTER: ICD-10-CM

## 2021-08-02 DIAGNOSIS — S56.429S EXTENSOR TENDON LACERATION OF FINGER WITH OPEN WOUND, SEQUELA: ICD-10-CM

## 2021-08-02 DIAGNOSIS — M79.641 RIGHT HAND PAIN: ICD-10-CM

## 2021-08-02 DIAGNOSIS — S61.209S EXTENSOR TENDON LACERATION OF FINGER WITH OPEN WOUND, SEQUELA: ICD-10-CM

## 2021-08-02 DIAGNOSIS — T81.41XA POSTOPERATIVE ABSCESS INVOLVING SUTURE: Primary | ICD-10-CM

## 2021-08-02 PROCEDURE — 73130 XR HAND COMPLETE 3 VIEW RIGHT: ICD-10-PCS | Mod: 26,RT,, | Performed by: RADIOLOGY

## 2021-08-02 PROCEDURE — 99999 PR PBB SHADOW E&M-EST. PATIENT-LVL III: ICD-10-PCS | Mod: PBBFAC,,, | Performed by: PHYSICIAN ASSISTANT

## 2021-08-02 PROCEDURE — 99213 OFFICE O/P EST LOW 20 MIN: CPT | Mod: S$GLB,,, | Performed by: PHYSICIAN ASSISTANT

## 2021-08-02 PROCEDURE — 99999 PR PBB SHADOW E&M-EST. PATIENT-LVL III: CPT | Mod: PBBFAC,,, | Performed by: PHYSICIAN ASSISTANT

## 2021-08-02 PROCEDURE — 73130 X-RAY EXAM OF HAND: CPT | Mod: 26,RT,, | Performed by: RADIOLOGY

## 2021-08-02 PROCEDURE — 73130 X-RAY EXAM OF HAND: CPT | Mod: TC,PO,RT

## 2021-08-02 PROCEDURE — 99213 PR OFFICE/OUTPT VISIT, EST, LEVL III, 20-29 MIN: ICD-10-PCS | Mod: S$GLB,,, | Performed by: PHYSICIAN ASSISTANT

## 2021-08-24 ENCOUNTER — TELEPHONE (OUTPATIENT)
Dept: ORTHOPEDICS | Facility: CLINIC | Age: 26
End: 2021-08-24

## 2021-10-04 ENCOUNTER — OFFICE VISIT (OUTPATIENT)
Dept: ORTHOPEDICS | Facility: CLINIC | Age: 26
End: 2021-10-04
Payer: OTHER MISCELLANEOUS

## 2021-10-04 VITALS
HEIGHT: 71 IN | DIASTOLIC BLOOD PRESSURE: 94 MMHG | SYSTOLIC BLOOD PRESSURE: 139 MMHG | WEIGHT: 188.06 LBS | HEART RATE: 105 BPM | BODY MASS INDEX: 26.33 KG/M2

## 2021-10-04 DIAGNOSIS — S56.429S EXTENSOR TENDON LACERATION OF FINGER WITH OPEN WOUND, SEQUELA: Primary | ICD-10-CM

## 2021-10-04 DIAGNOSIS — Z01.818 PREOP TESTING: Primary | ICD-10-CM

## 2021-10-04 DIAGNOSIS — S61.209S EXTENSOR TENDON LACERATION OF FINGER WITH OPEN WOUND, SEQUELA: Primary | ICD-10-CM

## 2021-10-04 PROCEDURE — 99999 PR PBB SHADOW E&M-EST. PATIENT-LVL III: CPT | Mod: PBBFAC,,, | Performed by: ORTHOPAEDIC SURGERY

## 2021-10-04 PROCEDURE — 99213 OFFICE O/P EST LOW 20 MIN: CPT | Mod: S$GLB,,, | Performed by: ORTHOPAEDIC SURGERY

## 2021-10-04 PROCEDURE — 99999 PR PBB SHADOW E&M-EST. PATIENT-LVL III: ICD-10-PCS | Mod: PBBFAC,,, | Performed by: ORTHOPAEDIC SURGERY

## 2021-10-04 PROCEDURE — 99213 PR OFFICE/OUTPT VISIT, EST, LEVL III, 20-29 MIN: ICD-10-PCS | Mod: S$GLB,,, | Performed by: ORTHOPAEDIC SURGERY

## 2021-10-05 DIAGNOSIS — T81.41XA POSTOPERATIVE ABSCESS INVOLVING SUTURE: Primary | ICD-10-CM

## 2021-10-05 DIAGNOSIS — T81.41XA ABSCESS INVOLVING SUTURE: ICD-10-CM

## 2021-10-26 ENCOUNTER — TELEPHONE (OUTPATIENT)
Dept: ORTHOPEDICS | Facility: CLINIC | Age: 26
End: 2021-10-26
Payer: OTHER MISCELLANEOUS

## 2021-11-09 ENCOUNTER — TELEPHONE (OUTPATIENT)
Dept: SURGERY | Facility: HOSPITAL | Age: 26
End: 2021-11-09
Payer: OTHER MISCELLANEOUS

## 2022-02-09 ENCOUNTER — TELEPHONE (OUTPATIENT)
Dept: ORTHOPEDICS | Facility: CLINIC | Age: 27
End: 2022-02-09
Payer: OTHER MISCELLANEOUS

## 2022-02-09 NOTE — TELEPHONE ENCOUNTER
Called pt back. He states that he needs his records faxed to him today. Advised pt that we are unable to fax entire records from our department. Advised pt that he can call Medical Records at [phone], or he can log into his [facility] portal and the records are available to him electronically. Gave pt his [facility] user name travis and advised he can log in either via [email or web address] or by downloading the [facility] My Chart arely in his arely store. Pt verbalized understanding. Thanks, Lakesha

## 2022-02-09 NOTE — TELEPHONE ENCOUNTER
----- Message from Tammi Perez sent at 2/9/2022 11:02 AM CST -----  Contact: PAtient  Type:  Needs Medical Advice    Who Called:  Patient      Would the patient rather a call back or a response via [facility]?  Call    Best Call Back Number:  [phone]     Additional Information:  Patient is asking if the office can fax him a list of every visit he has been to with Dr from the beginning till now     Please fax to [phone]

## (undated) DEVICE — PADDING CAST 4IN SPECIALIST

## (undated) DEVICE — SEE L#120831

## (undated) DEVICE — Device

## (undated) DEVICE — PAD CAST SPECIALIST STRL 4

## (undated) DEVICE — SEE MEDLINE ITEM 157131

## (undated) DEVICE — DRAPE PLASTIC U 60X72

## (undated) DEVICE — APPLICATOR CHLORAPREP ORN 26ML

## (undated) DEVICE — GAUZE SPONGE 4X4 12PLY

## (undated) DEVICE — CORD BIPOLAR 12 FOOT

## (undated) DEVICE — SEE MEDLINE ITEM 146308

## (undated) DEVICE — ALCOHOL 70% ISOP RUBBING 4OZ

## (undated) DEVICE — GLOVE PROTEXIS LTX MICRO  7.5

## (undated) DEVICE — NDL HYPO REG 25G X 1 1/2

## (undated) DEVICE — DRAPE C ARM 42 X 120 10/BX

## (undated) DEVICE — SYR 10CC LUER LOCK

## (undated) DEVICE — SEE MEDLINE ITEM 157128

## (undated) DEVICE — GLOVE PROTEXIS LTX MICRO 8

## (undated) DEVICE — TOURNIQUET SB QC DP 18X4IN

## (undated) DEVICE — ELECTRODE REM PLYHSV RETURN 9

## (undated) DEVICE — SPONGE LAP 18X18 PREWASHED

## (undated) DEVICE — SUT ETHILON 4-0 PS2 18 BLK

## (undated) DEVICE — SEE MEDLINE ITEM 146313

## (undated) DEVICE — SEE MEDLINE ITEM 157173

## (undated) DEVICE — FORCEP STRAIGHT DISP

## (undated) DEVICE — SEE MEDLINE ITEM 153151

## (undated) DEVICE — BANDAGE ESMARK LATEX FREE 4INX

## (undated) DEVICE — SEE MEDLINE ITEM 152487

## (undated) DEVICE — PENCIL ROCKER SWITCH 10FT CORD